# Patient Record
Sex: MALE
[De-identification: names, ages, dates, MRNs, and addresses within clinical notes are randomized per-mention and may not be internally consistent; named-entity substitution may affect disease eponyms.]

---

## 2018-03-15 ENCOUNTER — APPOINTMENT (OUTPATIENT)
Dept: INTERNAL MEDICINE | Facility: CLINIC | Age: 38
End: 2018-03-15
Payer: COMMERCIAL

## 2018-03-15 ENCOUNTER — MED ADMIN CHARGE (OUTPATIENT)
Age: 38
End: 2018-03-15

## 2018-03-15 ENCOUNTER — LABORATORY RESULT (OUTPATIENT)
Age: 38
End: 2018-03-15

## 2018-03-15 VITALS
OXYGEN SATURATION: 100 % | SYSTOLIC BLOOD PRESSURE: 128 MMHG | HEIGHT: 66 IN | TEMPERATURE: 97.9 F | RESPIRATION RATE: 17 BRPM | BODY MASS INDEX: 28.45 KG/M2 | WEIGHT: 177 LBS | DIASTOLIC BLOOD PRESSURE: 80 MMHG | HEART RATE: 67 BPM

## 2018-03-15 DIAGNOSIS — M25.512 PAIN IN LEFT SHOULDER: ICD-10-CM

## 2018-03-15 DIAGNOSIS — Z87.891 PERSONAL HISTORY OF NICOTINE DEPENDENCE: ICD-10-CM

## 2018-03-15 DIAGNOSIS — Z00.00 ENCOUNTER FOR GENERAL ADULT MEDICAL EXAMINATION W/OUT ABNORMAL FINDINGS: ICD-10-CM

## 2018-03-15 DIAGNOSIS — Z82.49 FAMILY HISTORY OF ISCHEMIC HEART DISEASE AND OTHER DISEASES OF THE CIRCULATORY SYSTEM: ICD-10-CM

## 2018-03-15 DIAGNOSIS — G43.909 MIGRAINE, UNSPECIFIED, NOT INTRACTABLE, W/OUT STATUS MIGRAINOSUS: ICD-10-CM

## 2018-03-15 DIAGNOSIS — D22.9 MELANOCYTIC NEVI, UNSPECIFIED: ICD-10-CM

## 2018-03-15 PROCEDURE — 90471 IMMUNIZATION ADMIN: CPT

## 2018-03-15 PROCEDURE — 90715 TDAP VACCINE 7 YRS/> IM: CPT

## 2018-03-15 PROCEDURE — 99385 PREV VISIT NEW AGE 18-39: CPT | Mod: 25

## 2018-03-15 RX ORDER — TOPIRAMATE 25 MG/1
25 TABLET, FILM COATED ORAL DAILY
Qty: 90 | Refills: 2 | Status: ACTIVE | COMMUNITY
Start: 2018-03-15 | End: 1900-01-01

## 2018-03-19 LAB
25(OH)D3 SERPL-MCNC: 25.6 NG/ML
ALBUMIN SERPL ELPH-MCNC: 5 G/DL
ALP BLD-CCNC: 75 U/L
ALT SERPL-CCNC: 27 U/L
ANION GAP SERPL CALC-SCNC: 14 MMOL/L
APPEARANCE: ABNORMAL
AST SERPL-CCNC: 26 U/L
BASOPHILS # BLD AUTO: 0.04 K/UL
BASOPHILS NFR BLD AUTO: 0.6 %
BILIRUB SERPL-MCNC: 0.6 MG/DL
BILIRUBIN URINE: NEGATIVE
BLOOD URINE: NEGATIVE
BUN SERPL-MCNC: 16 MG/DL
CALCIUM SERPL-MCNC: 9.4 MG/DL
CHLORIDE SERPL-SCNC: 102 MMOL/L
CHOLEST SERPL-MCNC: 196 MG/DL
CHOLEST/HDLC SERPL: 3.9 RATIO
CO2 SERPL-SCNC: 24 MMOL/L
COLOR: ABNORMAL
CREAT SERPL-MCNC: 1.04 MG/DL
EOSINOPHIL # BLD AUTO: 0.06 K/UL
EOSINOPHIL NFR BLD AUTO: 1 %
GLUCOSE QUALITATIVE U: NEGATIVE MG/DL
GLUCOSE SERPL-MCNC: 85 MG/DL
HBA1C MFR BLD HPLC: 5 %
HCT VFR BLD CALC: 49.2 %
HDLC SERPL-MCNC: 50 MG/DL
HGB BLD-MCNC: 15.8 G/DL
IMM GRANULOCYTES NFR BLD AUTO: 0.2 %
KETONES URINE: NEGATIVE
LDLC SERPL CALC-MCNC: 117 MG/DL
LEUKOCYTE ESTERASE URINE: NEGATIVE
LYMPHOCYTES # BLD AUTO: 1.62 K/UL
LYMPHOCYTES NFR BLD AUTO: 26.3 %
MAN DIFF?: NORMAL
MCHC RBC-ENTMCNC: 30.8 PG
MCHC RBC-ENTMCNC: 32.1 GM/DL
MCV RBC AUTO: 95.9 FL
MONOCYTES # BLD AUTO: 0.66 K/UL
MONOCYTES NFR BLD AUTO: 10.7 %
NEUTROPHILS # BLD AUTO: 3.77 K/UL
NEUTROPHILS NFR BLD AUTO: 61.2 %
NITRITE URINE: NEGATIVE
PH URINE: 6.5
PLATELET # BLD AUTO: 204 K/UL
POTASSIUM SERPL-SCNC: 4.4 MMOL/L
PROT SERPL-MCNC: 7.8 G/DL
PROTEIN URINE: NEGATIVE MG/DL
RBC # BLD: 5.13 M/UL
RBC # FLD: 13.7 %
SODIUM SERPL-SCNC: 140 MMOL/L
SPECIFIC GRAVITY URINE: 1.03
TRIGL SERPL-MCNC: 146 MG/DL
TSH SERPL-ACNC: 1.49 UIU/ML
UROBILINOGEN URINE: NEGATIVE MG/DL
WBC # FLD AUTO: 6.16 K/UL

## 2018-04-10 ENCOUNTER — TRANSCRIPTION ENCOUNTER (OUTPATIENT)
Age: 38
End: 2018-04-10

## 2018-11-18 ENCOUNTER — TRANSCRIPTION ENCOUNTER (OUTPATIENT)
Age: 38
End: 2018-11-18

## 2020-10-26 ENCOUNTER — TRANSCRIPTION ENCOUNTER (OUTPATIENT)
Age: 40
End: 2020-10-26

## 2023-10-12 ENCOUNTER — APPOINTMENT (OUTPATIENT)
Dept: PRIMARY CARE | Facility: CLINIC | Age: 43
End: 2023-10-12
Payer: COMMERCIAL

## 2023-10-17 ENCOUNTER — OFFICE VISIT (OUTPATIENT)
Dept: PRIMARY CARE | Facility: CLINIC | Age: 43
End: 2023-10-17
Payer: COMMERCIAL

## 2023-10-17 ENCOUNTER — ANCILLARY PROCEDURE (OUTPATIENT)
Dept: RADIOLOGY | Facility: CLINIC | Age: 43
End: 2023-10-17
Payer: COMMERCIAL

## 2023-10-17 VITALS
DIASTOLIC BLOOD PRESSURE: 82 MMHG | SYSTOLIC BLOOD PRESSURE: 112 MMHG | HEART RATE: 74 BPM | OXYGEN SATURATION: 96 % | WEIGHT: 179.8 LBS | HEIGHT: 66 IN | BODY MASS INDEX: 28.9 KG/M2

## 2023-10-17 DIAGNOSIS — L29.9 ITCHING: Primary | Chronic | ICD-10-CM

## 2023-10-17 DIAGNOSIS — R35.0 URINARY FREQUENCY: ICD-10-CM

## 2023-10-17 DIAGNOSIS — M79.641 PAIN OF RIGHT HAND: ICD-10-CM

## 2023-10-17 LAB
APPEARANCE UR: ABNORMAL
BILIRUB UR STRIP.AUTO-MCNC: NEGATIVE MG/DL
COLOR UR: YELLOW
ERYTHROCYTE [SEDIMENTATION RATE] IN BLOOD BY WESTERGREN METHOD: 5 MM/H (ref 0–15)
GLUCOSE UR STRIP.AUTO-MCNC: NEGATIVE MG/DL
KETONES UR STRIP.AUTO-MCNC: NEGATIVE MG/DL
LEUKOCYTE ESTERASE UR QL STRIP.AUTO: NEGATIVE
NITRITE UR QL STRIP.AUTO: NEGATIVE
PH UR STRIP.AUTO: 6 [PH]
PROT UR STRIP.AUTO-MCNC: NEGATIVE MG/DL
RBC # UR STRIP.AUTO: NEGATIVE /UL
SP GR UR STRIP.AUTO: 1.02
UROBILINOGEN UR STRIP.AUTO-MCNC: <2 MG/DL

## 2023-10-17 PROCEDURE — 36415 COLL VENOUS BLD VENIPUNCTURE: CPT

## 2023-10-17 PROCEDURE — 87086 URINE CULTURE/COLONY COUNT: CPT

## 2023-10-17 PROCEDURE — 73130 X-RAY EXAM OF HAND: CPT | Mod: RT

## 2023-10-17 PROCEDURE — 99202 OFFICE O/P NEW SF 15 MIN: CPT | Performed by: INTERNAL MEDICINE

## 2023-10-17 PROCEDURE — 86431 RHEUMATOID FACTOR QUANT: CPT

## 2023-10-17 PROCEDURE — 73130 X-RAY EXAM OF HAND: CPT | Mod: RIGHT SIDE | Performed by: RADIOLOGY

## 2023-10-17 PROCEDURE — 86140 C-REACTIVE PROTEIN: CPT

## 2023-10-17 PROCEDURE — 85652 RBC SED RATE AUTOMATED: CPT

## 2023-10-17 PROCEDURE — 1036F TOBACCO NON-USER: CPT | Performed by: INTERNAL MEDICINE

## 2023-10-17 PROCEDURE — 81003 URINALYSIS AUTO W/O SCOPE: CPT

## 2023-10-17 RX ORDER — BUSPIRONE HYDROCHLORIDE 15 MG/1
TABLET ORAL 2 TIMES DAILY
COMMUNITY
Start: 2021-08-09

## 2023-10-17 RX ORDER — SERTRALINE HYDROCHLORIDE 50 MG/1
TABLET, FILM COATED ORAL DAILY
COMMUNITY
Start: 2021-08-09

## 2023-10-17 ASSESSMENT — ENCOUNTER SYMPTOMS
CONSTITUTIONAL NEGATIVE: 1
FREQUENCY: 1
PAIN: 1

## 2023-10-17 ASSESSMENT — PATIENT HEALTH QUESTIONNAIRE - PHQ9
2. FEELING DOWN, DEPRESSED OR HOPELESS: NOT AT ALL
1. LITTLE INTEREST OR PLEASURE IN DOING THINGS: NOT AT ALL
1. LITTLE INTEREST OR PLEASURE IN DOING THINGS: NOT AT ALL
2. FEELING DOWN, DEPRESSED OR HOPELESS: NOT AT ALL
SUM OF ALL RESPONSES TO PHQ9 QUESTIONS 1 AND 2: 0
SUM OF ALL RESPONSES TO PHQ9 QUESTIONS 1 AND 2: 0

## 2023-10-17 NOTE — PROGRESS NOTES
"Patient ID: Alexx Burns is a 43 y.o. male who presents for Annual Exam, Itching (Hands and feet), Hand Pain (Right middle knuckle), and Pain (Above left testicle).    /82   Pulse 74   Ht 1.676 m (5' 6\")   Wt 81.6 kg (179 lb 12.8 oz)   SpO2 96%   BMI 29.02 kg/m²     Hand Pain     Pain          I AM HAVING ITCHING IN HANDS AND FEET   2 MONTHS     I AM HAVING RT HAND PAIN TOO  RT MIDDLE KNUCKLE PAIN   NO SOFT TISSUE SWELLING   NO INJURY OR TRAUMA       PAIN LEFT TESTICLE   NO INJURY OR TRAUMA     NO URINARY FREQUENCY   NO URGENCY , I HAD PROSTATITIS     Subjective     Review of Systems   Constitutional: Negative.    Genitourinary:  Positive for frequency, testicular pain and urgency.   Musculoskeletal:         RT HAND PAIN    All other systems reviewed and are negative.      Objective     Physical Exam  Vitals and nursing note reviewed.   Cardiovascular:      Rate and Rhythm: Normal rate and regular rhythm.      Pulses: Normal pulses.      Heart sounds: Normal heart sounds.   Musculoskeletal:      Comments: RT HAND TENDERNESS OVER THE RT MIDDLE FINGER    Psychiatric:         Mood and Affect: Mood normal.         Behavior: Behavior normal.         Thought Content: Thought content normal.         Judgment: Judgment normal.         No results found for: \"WBC\", \"HGB\", \"HCT\", \"MCV\", \"PLT\"        Problem List Items Addressed This Visit       Itching - Primary     Other Visit Diagnoses       Urinary frequency        Relevant Orders    Urine Culture    Urinalysis with Reflex Microscopic    Pain of right hand        Relevant Orders    Rheumatoid factor    XR hand right 3+ views    Sedimentation Rate    C-reactive protein                 A/P       SED RATE , CRP , RHEUMATOID FACTOR   X-RAY RT HAND   URINALYSIS  URINE CULTURE   USE ADEQUATE AMOUNT OF SKIN MOISTURIZER   FOLLOW UP IF NEEDED   "

## 2023-10-18 LAB
BACTERIA UR CULT: NO GROWTH
CRP SERPL-MCNC: 0.5 MG/DL
RHEUMATOID FACT SER NEPH-ACNC: <10 IU/ML (ref 0–15)

## 2024-04-16 ENCOUNTER — OFFICE VISIT (OUTPATIENT)
Dept: UROLOGY | Facility: CLINIC | Age: 44
End: 2024-04-16
Payer: MEDICARE

## 2024-04-16 VITALS — TEMPERATURE: 97.4 F

## 2024-04-16 DIAGNOSIS — N50.812 TESTICULAR PAIN, LEFT: Primary | ICD-10-CM

## 2024-04-16 DIAGNOSIS — M62.89 PELVIC FLOOR DYSFUNCTION: ICD-10-CM

## 2024-04-16 DIAGNOSIS — Z87.442 HISTORY OF NEPHROLITHIASIS: ICD-10-CM

## 2024-04-16 PROCEDURE — 1036F TOBACCO NON-USER: CPT | Performed by: UROLOGY

## 2024-04-16 PROCEDURE — 99204 OFFICE O/P NEW MOD 45 MIN: CPT | Performed by: UROLOGY

## 2024-04-16 ASSESSMENT — PAIN SCALES - GENERAL: PAINLEVEL: 3

## 2024-04-16 NOTE — PROGRESS NOTES
Subjective   Alexx Burns is a 43 y.o. male presenting as a new patient today due to pelvic floor pain. Patient reports worsening urinary frequency, urgency and intermittent urinary stream. He has complaints of left scrotal pain. Patient has history of renal stones which he has passed spontaneously. He also reports an episode of hematospermia three weeks ago which has now resolved. Last urine culture in 10/2023 was negative. Denies any recent gross hematuria, fevers, chills, urinary retention, intractable flank or abdominal pain, nausea or vomiting.      No past medical history on file.  Past Surgical History:   Procedure Laterality Date    MR HEAD ANGIO WO IV CONTRAST  2/9/2016    MR HEAD ANGIO WO IV CONTRAST 2/9/2016 [facility]GAC[facility]     No family history on file.  Current Outpatient Medications   Medication Sig Dispense Refill    busPIRone (Buspar) 15 mg tablet Take by mouth 2 times a day.      sertraline (Zoloft) 50 mg tablet Take by mouth once daily.       No current facility-administered medications for this visit.     Allergies   Allergen Reactions    Penicillins Hives     Social History     Socioeconomic History    Marital status:      Spouse name: Not on file    Number of children: Not on file    Years of education: Not on file    Highest education level: Not on file   Occupational History    Not on file   Tobacco Use    Smoking status: Never    Smokeless tobacco: Never   Substance and Sexual Activity    Alcohol use: Not on file    Drug use: Not on file    Sexual activity: Not on file   Other Topics Concern    Not on file   Social History Narrative    Not on file     Social Determinants of Health     Financial Resource Strain: Not on file   Food Insecurity: Not on file   Transportation Needs: Not on file   Physical Activity: Not on file   Stress: Not on file   Social Connections: Not on file   Intimate Partner Violence: Not on file   Housing Stability: Not on file       Review of  Systems  Pertinent items are noted in HPI.    Objective           Assessment/Plan   Diagnoses and all orders for this visit:  Testicular pain, left  -     US scrotum w doppler; Future  History of nephrolithiasis  -     US renal complete; Future  Pelvic floor dysfunction    Left Scrotal Pain     We will obtain a scrotal US to r/o any scrotal pathology.     Follow up virtually to review.     2. History of nephrolithiasis     We will obtain a renal US given patient's history of renal stones.     3. Pelvic floor dysfunction     We will refer patient to PFPT for further management.     All questions were answered to the patient's satisfaction. Patient agrees with the plan and wishes to proceed. Follow-up will be scheduled appropriately.     Scribed for Dr. Villalba by Diane Hummel. I , Dr iVllalba, have personally reviewed and agreed with the information entered by the Virtual Scribe.

## 2024-04-19 ENCOUNTER — HOSPITAL ENCOUNTER (OUTPATIENT)
Dept: RADIOLOGY | Facility: HOSPITAL | Age: 44
Discharge: HOME | End: 2024-04-19
Payer: MEDICARE

## 2024-04-19 DIAGNOSIS — Z87.442 HISTORY OF NEPHROLITHIASIS: ICD-10-CM

## 2024-04-19 DIAGNOSIS — N50.812 TESTICULAR PAIN, LEFT: ICD-10-CM

## 2024-04-19 PROCEDURE — 93975 VASCULAR STUDY: CPT

## 2024-04-19 PROCEDURE — 76770 US EXAM ABDO BACK WALL COMP: CPT | Mod: 59

## 2024-05-07 ENCOUNTER — TELEMEDICINE (OUTPATIENT)
Dept: UROLOGY | Facility: CLINIC | Age: 44
End: 2024-05-07
Payer: MEDICARE

## 2024-05-07 DIAGNOSIS — M62.89 PELVIC FLOOR DYSFUNCTION: ICD-10-CM

## 2024-05-07 DIAGNOSIS — N50.3 EPIDIDYMAL CYST: ICD-10-CM

## 2024-05-07 DIAGNOSIS — N50.812 TESTICULAR PAIN, LEFT: ICD-10-CM

## 2024-05-07 PROCEDURE — 99213 OFFICE O/P EST LOW 20 MIN: CPT | Performed by: UROLOGY

## 2024-05-07 NOTE — PROGRESS NOTES
Subjective     This visit was completed via telemedicine. All issues as below were discussed and addressed but no physical exam was performed unless allowed by visual confirmation. If it was felt that the patient should be evaluated in clinic, then they were directed there. Patient verbally consented to visit.    Alexx Burns is a 43 y.o. male with history of nephrolithiasis, pelvic floor dysfunction and scrotal pain, presenting today to review scrotal US and renal US.      LAST VISIT (4/16/2024):   Alexx Burns is a 43 y.o. male presenting as a new patient today due to pelvic floor pain. Patient reports worsening urinary frequency, urgency and intermittent urinary stream. He has complaints of left scrotal pain. Patient has history of renal stones which he has passed spontaneously. He also reports an episode of hematospermia three weeks ago which has now resolved. Last urine culture in 10/2023 was negative. Denies any recent gross hematuria, fevers, chills, urinary retention, intractable flank or abdominal pain, nausea or vomiting.          No past medical history on file.  Past Surgical History:   Procedure Laterality Date    MR HEAD ANGIO WO IV CONTRAST  2/9/2016    MR HEAD ANGIO WO IV CONTRAST 2/9/2016 [facility] CLINICAL LEGAC[facility]     No family history on file.  Current Outpatient Medications   Medication Sig Dispense Refill    busPIRone (Buspar) 15 mg tablet Take by mouth 2 times a day.      sertraline (Zoloft) 50 mg tablet Take by mouth once daily.       No current facility-administered medications for this visit.     Allergies   Allergen Reactions    Penicillins Hives     Social History     Socioeconomic History    Marital status:      Spouse name: Not on file    Number of children: Not on file    Years of education: Not on file    Highest education level: Not on file   Occupational History    Not on file   Tobacco Use    Smoking status: Never    Smokeless tobacco: Never   Substance and Sexual Activity     "Alcohol use: Not on file    Drug use: Not on file    Sexual activity: Not on file   Other Topics Concern    Not on file   Social History Narrative    Not on file     Social Determinants of Health     Financial Resource Strain: Not on file   Food Insecurity: Not on file   Transportation Needs: Not on file   Physical Activity: Not on file   Stress: Not on file   Social Connections: Not on file   Intimate Partner Violence: Not on file   Housing Stability: Not on file       Review of Systems  Pertinent items are noted in HPI.    Objective       Lab Review  No results found for: \"WBC\", \"RBC\", \"HGB\", \"HCT\", \"PLT\"   No results found for: \"BUN\", \"CREATININE\"   No results found for: \"PSA\"      Assessment/Plan   There are no diagnoses linked to this encounter.  History of nephrolithiasis     I reviewed renal US from 4/19/2024 which showed no hydronephrosis is present; no evidence of nephrolithiasis.    We will follow up annually with renal US to monitor stone presence and formation.     2. Left scrotal pain     I reviewed scrotal US from 4/19/2024 which showed epididymal head cysts measuring 0.4 cm on the right and 0.2 cm on the left.    Patient reports when he underwent the scrotal US his pain was localized to his epididymal cyst.     We will refer him to Dr. Yi for evaluation for potential removal of cysts.     All questions were answered to the patient's satisfaction. Patient agrees with the plan and wishes to proceed. Follow-up will be scheduled appropriately.     I spent 20 minutes of dedicated E&M time, including preparation and review of records, notes, and data, time spent with patient/family, and documentation.     Scribed for Dr. Villalba by Diane Hummel. I , Dr Villalba, have personally reviewed and agreed with the information entered by the Virtual Scribe.     "

## 2024-06-05 ENCOUNTER — OFFICE VISIT (OUTPATIENT)
Dept: UROLOGY | Facility: CLINIC | Age: 44
End: 2024-06-05
Payer: MEDICARE

## 2024-06-05 VITALS — TEMPERATURE: 97 F

## 2024-06-05 DIAGNOSIS — I86.1 LEFT VARICOCELE: ICD-10-CM

## 2024-06-05 DIAGNOSIS — N50.819 PAIN IN TESTICLE, UNSPECIFIED LATERALITY: ICD-10-CM

## 2024-06-05 DIAGNOSIS — N50.9 TESTICULAR ABNORMALITY: Primary | ICD-10-CM

## 2024-06-05 LAB
MUCOUS THREADS #/AREA URNS AUTO: NORMAL /LPF
RBC #/AREA URNS AUTO: NORMAL /HPF
WBC #/AREA URNS AUTO: NORMAL /HPF

## 2024-06-05 PROCEDURE — 1036F TOBACCO NON-USER: CPT | Performed by: UROLOGY

## 2024-06-05 PROCEDURE — 87491 CHLMYD TRACH DNA AMP PROBE: CPT

## 2024-06-05 PROCEDURE — 99204 OFFICE O/P NEW MOD 45 MIN: CPT | Performed by: UROLOGY

## 2024-06-05 PROCEDURE — 87086 URINE CULTURE/COLONY COUNT: CPT

## 2024-06-05 PROCEDURE — 81001 URINALYSIS AUTO W/SCOPE: CPT

## 2024-06-05 PROCEDURE — 87591 N.GONORRHOEAE DNA AMP PROB: CPT

## 2024-06-05 ASSESSMENT — PAIN SCALES - GENERAL: PAINLEVEL: 2

## 2024-06-05 NOTE — PROGRESS NOTES
HPI:  43 y.o. yo male patient complains of  scrotal pain    Dr. Villalba's note reviewed  Ucx neg in 10/2023    #Scrotal pain   How long has this been going on- several months;  ongoing 6-7 years  which side/ or both- Both  where is pain located- at the bottom of the testicles  any noticeable swelling- none  what was tried to relieve pain- not verbalized   History of UTI/ STD exposure- not verbalized  History of vasectomy-  none      -urgent care x5  -uti, prostatitis, multiple workups negative  -constant pressure above left testicle  -unable to sit for long periods of time  -prev. Doses of ATB, pain meds    04/19/2024    US SCROTUM - personally reviewed/interpreted  IMPRESSION:  1. Normal sonographic appearance of both testicles.  2. Incidentally noted epididymal head cysts measuring 0.4 cm on the  right and 0.2 cm on the left.       === 04/19/24 ===    US RENAL COMPLETE    - Impression -  Normal sonographic appearance of both kidneys.    PMH:  History reviewed. No pertinent past medical history.     PSH:  Past Surgical History:   Procedure Laterality Date    MR HEAD ANGIO WO IV CONTRAST  2/9/2016    MR HEAD ANGIO WO IV CONTRAST 2/9/2016 [facility] CL[facility]ICAL LEGAC[facility]        Medications:    Current Outpatient Medications:     busPIRone (Buspar) 15 mg tablet, Take by mouth 2 times a day., Disp: , Rfl:     sertraline (Zoloft) 50 mg tablet, Take by mouth once daily., Disp: , Rfl:     Allergy:  Allergies   Allergen Reactions    Penicillins Hives        Exam  Testicles descended bilaterally, nontender, no masses  Vasa palpable bilaterally  Varicocele: left grade 2, Yes  Penis circ'd, no lesions, no plaques      Assessment/Plan  #scrotal pain - left, chronic  Discussed scrotal pain and its different etiologies, including epididymitis, cancer, orchitis, etc.  Discussed workup for epididymo-orchitis could include US, urine culture, and GC/CT  Discussed that even if he has a varicocele, there is no guarantee that this pain is from  varicocele and that varicocele repair will alleviate the pain  Discussed cord block and microsurgical cord denervation in detail, including risks benefits and alternatives  Discussed conservative measures like nsaids, elevation, ice, tight underwear etc.  Also discussed pelvic floor PT    Patient elects   UA/CX/GC/CT/ureaplasma/mycoplasma   Pelvic floor PT    Follow up in 3 months , if no benefit will pursue cord injecitons      Scribe Attestation  By signing my name below, ILacey, Scribe   attest that this documentation has been prepared under the direction and in the presence of Romi Yi MD.    This note has been transcribed using a medical scribe and there is a possibility of unintentional typing misprints.

## 2024-06-05 NOTE — PROGRESS NOTES
NPV  -left testicle pain  -urgent care x5  -uti, prostatitis, multiple workups negative  -constant pressure above left testicle  -prev. Seen Dr. Villalba (U/S) ordered  -U/S showing cyst  -ongoing 6-7 years  -denies swelling  -unable to sit for long periods of time  -prev. Doses of ATB, pain meds

## 2024-06-06 LAB
C TRACH RRNA SPEC QL NAA+PROBE: NEGATIVE
N GONORRHOEA DNA SPEC QL PROBE+SIG AMP: NEGATIVE

## 2024-06-07 LAB — BACTERIA UR CULT: NORMAL

## 2024-06-11 ENCOUNTER — TELEPHONE (OUTPATIENT)
Dept: UROLOGY | Facility: CLINIC | Age: 44
End: 2024-06-11
Payer: MEDICARE

## 2024-06-11 NOTE — TELEPHONE ENCOUNTER
Jeaneth with  lab called to report a cancelled lab for Dr. Yi's patient. She can be reached at [phone].

## 2024-09-10 ENCOUNTER — APPOINTMENT (OUTPATIENT)
Dept: UROLOGY | Facility: CLINIC | Age: 44
End: 2024-09-10
Payer: MEDICARE